# Patient Record
Sex: FEMALE | Race: WHITE
[De-identification: names, ages, dates, MRNs, and addresses within clinical notes are randomized per-mention and may not be internally consistent; named-entity substitution may affect disease eponyms.]

---

## 2017-03-22 ENCOUNTER — HOSPITAL ENCOUNTER (EMERGENCY)
Dept: HOSPITAL 80 - FED | Age: 42
Discharge: HOME | End: 2017-03-22
Payer: COMMERCIAL

## 2017-03-22 VITALS — SYSTOLIC BLOOD PRESSURE: 122 MMHG | DIASTOLIC BLOOD PRESSURE: 71 MMHG | HEART RATE: 82 BPM | OXYGEN SATURATION: 96 %

## 2017-03-22 VITALS — RESPIRATION RATE: 18 BRPM | TEMPERATURE: 98.4 F

## 2017-03-22 DIAGNOSIS — J45.901: Primary | ICD-10-CM

## 2017-03-22 NOTE — EDPHY
H & P


Time Seen by Provider: 03/22/17 12:09


HPI/ROS: 





CHIEF COMPLAINT:  Asthma exacerbation





HISTORY OF PRESENT ILLNESS:  Patient is a 41-year-old healthy female with a 

history of asthma who comes to the emergency department after an exacerbation.  

She was at the pediatrician's office with her daughter was recently diagnosed 

with influenza.  She became short of breath and coughing.  She was treated 

there with a albuterol neb and Solu-Medrol.  She has a transferred here by EMS.

  She feels much better.  Her lung sounds are clear and she is saturating 98% 

on room air.  She does have a cough and chills. She also complains of chest 

tightness.  No history of cardiac disease.








REVIEW OF SYSTEMS:


Constitutional:  denies: chills, fever, recent illness, recent injury


EENTM: denies: blurred vision, double vision, nose congestion


Respiratory:  See HPI


Cardiac: denies: chest pain, irregular heart rate, lightheadedness, palpitations


Gastrointestinal/Abdominal: denies: abdominal pain, diarrhea, nausea, vomiting, 

blood streaked stools


Genitourinary: denies: dysuria, frequency, hematuria, pain


Musculoskeletal: denies: joint pain, muscle pain


Skin: denies: lesions, rash, jaundice, bruising


Neurological: denies: headache, numbness, paresthesia, tingling, dizziness, 

weakness


Hematologic/Lymphatic: denies: blood clots, easy bleeding, easy bruising


Immunologic/allergic: denies: HIV/AIDS, transplant








EXAM:


GENERAL:  Well-appearing, well-nourished and in no acute distress.


HEAD:  Atraumatic, normocephalic.


EYES:  Pupils equal round and reactive to light, extraocular movements intact, 

sclera anicteric, conjunctiva are normal.


ENT:  TMs normal, nares patent, oropharynx clear without exudates.  Moist 

mucous membranes.


NECK:  Normal range of motion, supple without lymphadenopathy or JVD.


LUNGS:  Breath sounds clear to auscultation bilaterally and equal.  No wheezes 

rales or rhonchi.


HEART:  Regular rate and rhythm without murmurs, rubs or gallops.


ABDOMEN:  Soft, nontender, normoactive bowel sounds.  No guarding, no rebound.  

No masses appreciated.


BACK:  No CVA tenderness, no spinal tenderness, step-offs or deformities


EXTREMITIES:  Normal range of motion, no pitting or edema.  No clubbing or 

cyanosis.


NEUROLOGICAL:  Cranial nerves II through XII grossly intact.  Normal speech, 

normal gait.  5/5 strength, normal movement in all extremities, normal sensation


PSYCH:  Normal mood, normal affect.


SKIN:  Warm, dry, normal turgor, no visible rashes or lesions.








Source: Patient


Exam Limitations: No limitations





- Medical/Surgical History


Hx Asthma: Yes


Hx Chronic Respiratory Disease: No


Hx Diabetes: No


Hx Cardiac Disease: No


Hx Renal Disease: No


Hx Cirrhosis: No


Hx Alcoholism: No


Hx HIV/AIDS: No


Hx Splenectomy or Spleen Trauma: No


Other PMH: PMH: asthma.  PSH: denies





- Family History


Significant Family History: Asthma





- Social History


Smoking Status: Never smoked


Alcohol Use: Sober


Drug Use: None


Constitutional: 


 Initial Vital Signs











Temperature (C)  36.9 C   03/22/17 12:23


 


Heart Rate  97   03/22/17 12:23


 


Respiratory Rate  18   03/22/17 12:23


 


Blood Pressure  124/72 H  03/22/17 12:23


 


O2 Sat (%)  99   03/22/17 12:23








 











O2 Delivery Mode               Room Air














Allergies/Adverse Reactions: 


 





No Known Allergies Allergy (Unverified 07/17/11 08:28)


 








Home Medications: 














 Medication  Instructions  Recorded


 


Flonase Allergy Relief  09/27/16


 


Loratadine  09/27/16


 


Wellbutrin Sr  09/27/16


 


Oseltamivir Phosphate [Tamiflu 75 75 mg PO BID #10 cap 03/22/17





mg (*)]  


 


predniSONE 60 mg PO DAILY #15 tab 03/22/17














Medical Decision Making





- Diagnostics


EKG Interpretation: 





An EKG obtained and was read and documented in trace view.  Please see trace 

view for full reading and report.  Sinus rhythm, no acute ischemic changes 


Imaging: 





X-ray: [chest x-ray ] was obtained.  I viewed the images myself on the PACS 

system.  My interpretation of the images is:  [negative for acute disease ].  

The radiologist interpretation is  pending.


ED Course/Re-evaluation: 





1:40 p.m. patient states that she is feeling completely better.  She is asking 

for a course of steroids to help with her coughing inflammation and wheezing.  

I agree that this is reasonable.  She already has albuterol at home and 

nebulized and inhaler form.  Her daughter is flu positive.  Patient does not 

have fevers body aches but will give her prescription for Tamiflu cause a.m. 

suspicious that she has early symptoms of flu that have not yet converted on 

her testing swabs.


Differential Diagnosis: 





Partial list of the Differential diagnosis considered include but were not 

limited to;  asthma exacerbation, upper respiratory tract infection, influenza 

and although unlikely based on the history and physical exam, I also considered 

pneumonia, PE, acute coronary disease.  I discussed these differential 

diagnoses and the plan with the patient as well as the usual and expected 

course.  The patient understands that the diagnosis is provisional and that in 

medicine we are not always correct and that further workup is often warranted.  

Usual and customary warnings were given.  All of the patient's questions were 

answered.  The patient was instructed to return to the emergency department 

should the symptoms at all worsen or return, otherwise to followup with the 

physician as we discussed.





- Data Points


Laboratory Results: 


 











  03/22/17





  12:20


 


Influenza Typ A,B (DFA)  NEGATIVE FOR FLU 





   (NEGATIVE) 














Departure





- Departure


Disposition: Home, Routine, Self-Care


Clinical Impression: 


 Exacerbation of asthma





Condition: Fair


Instructions:  Asthma (ED)


Referrals: 


Patient,NotPresent [Unknown] - As per Instructions


Prescriptions: 


Oseltamivir Phosphate [Tamiflu 75 mg (*)] 75 mg PO BID #10 cap


predniSONE 60 mg PO DAILY #15 tab

## 2017-03-22 NOTE — CPEKG
Heart Rate: 83

RR Interval: 723

P-R Interval: 164

QRSD Interval: 90

QT Interval: 372

QTC Interval: 437

P Axis: 75

QRS Axis: 44

T Wave Axis: 10

EKG Severity - NORMAL ECG -

EKG Impression: SINUS RHYTHM

Electronically Signed By: Sukhi Mccoy 22-Mar-2017 13:20:03

## 2017-03-25 ENCOUNTER — HOSPITAL ENCOUNTER (OUTPATIENT)
Dept: HOSPITAL 80 - FIMAGING | Age: 42
End: 2017-03-25
Attending: INTERNAL MEDICINE
Payer: COMMERCIAL

## 2017-03-25 DIAGNOSIS — R91.8: ICD-10-CM

## 2017-03-25 DIAGNOSIS — R07.9: Primary | ICD-10-CM

## 2017-04-02 ENCOUNTER — HOSPITAL ENCOUNTER (EMERGENCY)
Dept: HOSPITAL 80 - FED | Age: 42
Discharge: HOME | End: 2017-04-02
Payer: COMMERCIAL

## 2017-04-02 VITALS
HEART RATE: 70 BPM | RESPIRATION RATE: 16 BRPM | OXYGEN SATURATION: 97 % | DIASTOLIC BLOOD PRESSURE: 72 MMHG | SYSTOLIC BLOOD PRESSURE: 108 MMHG | TEMPERATURE: 97.7 F

## 2017-04-02 DIAGNOSIS — R51: Primary | ICD-10-CM

## 2017-04-02 DIAGNOSIS — J45.909: ICD-10-CM

## 2017-04-02 NOTE — EDPHY
H & P


Stated Complaint: uri symptoms/rx vicodin and abx now with 3 days ha/n/v


Source: Patient


Exam Limitations: No limitations





- Personal History


LMP (Females 10-55): 1-7 Days Ago


Current Tetanus/Diphtheria Vaccine: Yes





- Medical/Surgical History


Hx Asthma: Yes


Hx Chronic Respiratory Disease: No


Hx Diabetes: No


Hx Cardiac Disease: No


Hx Renal Disease: No


Hx Cirrhosis: No


Hx Alcoholism: No


Hx HIV/AIDS: No


Hx Splenectomy or Spleen Trauma: No


Other PMH: PMH: asthma.  PSH: denies





- Social History


Smoking Status: Never smoked


Time Seen by Provider: 04/02/17 16:52


HPI/ROS: 





CHIEF COMPLAINT: headache, vomiting





HISTORY OF PRESENT ILLNESS:  41-year-old female presents emergency department 

complaining a headache, nausea and vomiting.  Patient is on day 7 of 10 of 

Augmentin for on upper respiratory infection.  She has a history of asthma, she 

finished a 5 day course of prednisone 2 days ago.  Patient reports her URI 

symptoms are improving though she has had a 2 day history of headache and 

vomiting. Patient reports photophobia.  Patient reports a history of headaches 

though she has not had 1 like this for several months.  Patient states her 

headache has improved since arriving in the emergency department.  She denies 

neck pain. Headache comes and goes.  Patient reports she vomits from the pain. 

No diarrhea, no chest pain or shortness of breath. 





REVIEW OF SYSTEMS:


A comprehensive 10 point review of systems is otherwise negative aside from 

elements mentioned in the history of present illness. (Melissa Rios)





- Physical Exam


Exam: 





Physical Exam


Gen: Alert and Oriented, NAD 


HEENT: PERRL, moist mucous membranes 


NECK: no meningismus


CV: regular rate and regular rhythm


PULM: CTAB, mild expiratory wheezes throughout


ABDOMEN: soft, non tender to palpation, BS present 


BACK: No CVA tenderness


NEURO:  Neurologically grossly intact 


EXTREMITIES: normal appearing


SKIN: no rash or break in skin on exposed skin


PSYCH: answers questions appropriately. (Melissa Rios)


Constitutional: 





 Initial Vital Signs











Temperature (C)  36.3 C   04/02/17 16:27


 


Heart Rate  66   04/02/17 16:27


 


Respiratory Rate  22 H  04/02/17 16:27


 


Blood Pressure  99/75 L  04/02/17 16:27


 


O2 Sat (%)  98   04/02/17 16:27








 











O2 Delivery Mode               Room Air














Allergies/Adverse Reactions: 


 





No Known Allergies Allergy (Verified 04/02/17 16:26)


 








Home Medications: 














 Medication  Instructions  Recorded


 


Flonase Allergy Relief  09/27/16


 


Loratadine  09/27/16


 


Wellbutrin Sr  09/27/16


 


Oseltamivir Phosphate [Tamiflu 75 75 mg PO BID #10 cap 03/22/17





mg (*)]  


 


predniSONE 60 mg PO DAILY #15 tab 03/22/17














Medical Decision Making


ED Course/Re-evaluation: 





IV established, patient is given 1 L of normal saline.  She is given a migraine 

cocktail for headache. Patient was re-evaluated an hour later with complete 

resolution of her headache. She is requesting to be discharged home.  The 

patient is given strict return precautions for any worsening symptoms, new 

symptoms or concerns. (Melissa Rios)


Differential Diagnosis: 





The differential diagnosis for the patient's headache included but was not 

limited to subarachnoid hemorrhage, migraine headache, tension headache and 

infectious causes such as meningitis, pharyngitis and sinusitis. (Melissa Rios)


Other Provider: 





The patient wasevaluatedand managed by themidlevel provider.  Idiscussed 

the patient's presentation and course with thephysicianassistantor nurse

practitionerand agree with theevaluation. My co-signature indicates that I

have reviewed this chart and I agree with the findings and plan of care as

documented. I am the secondary supervisingphysician. (Marisol Hodgson)





- Data Points


Medications Given: 





 








Discontinued Medications





Acetaminophen (Tylenol)  650 mg PO EDNOW ONE


   Stop: 04/02/17 17:26


   Last Admin: 04/02/17 17:47 Dose:  650 mg


Dexamethasone (Decadron Injection)  10 mg IVP EDNOW ONE


   Stop: 04/02/17 17:27


   Last Admin: 04/02/17 17:47 Dose:  10 mg


Diphenhydramine HCl (Benadryl Injection)  25 mg IVP EDNOW ONE


   Stop: 04/02/17 17:25


   Last Admin: 04/02/17 17:46 Dose:  25 mg


Sodium Chloride (Ns)  1,000 mls @ 0 mls/hr IV ONCE ONE


   PRN Reason: Wide Open


   Stop: 04/02/17 17:24


   Last Admin: 04/02/17 17:30 Dose:  1,000 mls


Ketorolac Tromethamine (Toradol)  15 mg IVP EDNOW ONE


   Stop: 04/02/17 17:25


   Last Admin: 04/02/17 17:47 Dose:  15 mg


Metoclopramide HCl (Reglan Injection)  10 mg IVP EDNOW ONE


   Stop: 04/02/17 17:25


   Last Admin: 04/02/17 17:47 Dose:  10 mg








Departure





- Departure


Disposition: Home, Routine, Self-Care


Clinical Impression: 


Headache


Qualifiers:


 Headache type: unspecified Headache chronicity pattern: acute headache 

Intractability: not intractable Qualified Code(s): R51 - Headache





Condition: Good


Instructions:  General Headache (ED)


Additional Instructions: 


Stay hydrated, take 600 mg of ibuprofen every 8 hours with food for 3-5 days, 

you could also take 650 mg of Tylenol every 8 hours, you can alternate these 

every 4 hours.  Follow-up with Dr. Genao next week for symptoms that are not 

improving, return to the emergency department for worsening symptoms, fevers, 

neck pain, seizure activity, any new symptoms or concerns.


Referrals: 


Delon Genao MD [Primary Care Provider] - As per Instructions

## 2017-09-15 ENCOUNTER — HOSPITAL ENCOUNTER (OUTPATIENT)
Dept: HOSPITAL 80 - BMCIMAGING | Age: 42
End: 2017-09-15
Attending: EMERGENCY MEDICINE
Payer: COMMERCIAL

## 2017-09-15 DIAGNOSIS — M25.531: Primary | ICD-10-CM

## 2017-12-02 ENCOUNTER — HOSPITAL ENCOUNTER (EMERGENCY)
Dept: HOSPITAL 80 - FED | Age: 42
Discharge: HOME | End: 2017-12-02
Payer: COMMERCIAL

## 2017-12-02 VITALS
OXYGEN SATURATION: 96 % | SYSTOLIC BLOOD PRESSURE: 107 MMHG | HEART RATE: 55 BPM | RESPIRATION RATE: 14 BRPM | DIASTOLIC BLOOD PRESSURE: 78 MMHG

## 2017-12-02 VITALS — TEMPERATURE: 98.2 F

## 2017-12-02 DIAGNOSIS — J45.909: ICD-10-CM

## 2017-12-02 DIAGNOSIS — N39.0: Primary | ICD-10-CM

## 2017-12-02 DIAGNOSIS — B96.89: ICD-10-CM

## 2017-12-02 LAB
% IMMATURE GRANULYOCYTES: 0.2 % (ref 0–1.1)
ABSOLUTE IMMATURE GRANULOCYTES: 0.01 10^3/UL (ref 0–0.1)
ABSOLUTE NRBC COUNT: 0 10^3/UL (ref 0–0.01)
ADD DIFF?: NO
ADD MORPH?: NO
ADD SCAN?: NO
ANION GAP SERPL CALC-SCNC: 11 MEQ/L (ref 8–16)
ATYPICAL LYMPHOCYTE FLAG: 20 (ref 0–99)
CALCIUM SERPL-MCNC: 9.6 MG/DL (ref 8.5–10.4)
CHLORIDE SERPL-SCNC: 106 MEQ/L (ref 97–110)
CO2 SERPL-SCNC: 23 MEQ/L (ref 22–31)
COLOR UR: YELLOW
CREAT SERPL-MCNC: 0.7 MG/DL (ref 0.6–1)
ERYTHROCYTE [DISTWIDTH] IN BLOOD BY AUTOMATED COUNT: 11.9 % (ref 11.5–15.2)
FRAGMENT RBC FLAG: 0 (ref 0–99)
GFR SERPL CREATININE-BSD FRML MDRD: > 60 ML/MIN/{1.73_M2}
GLUCOSE SERPL-MCNC: 81 MG/DL (ref 70–100)
HCT VFR BLD CALC: 41 % (ref 38–47)
HGB BLD-MCNC: 14.2 G/DL (ref 12.6–16.3)
LEFT SHIFT FLG: 0 (ref 0–99)
LIPEMIA HEMOLYSIS FLAG: 90 (ref 0–99)
MCH RBC BLDCO QN: 30.1 PG (ref 27.9–34.1)
MCHC RBC AUTO-ENTMCNC: 34.6 G/DL (ref 32.4–36.7)
MCV RBC AUTO: 87 FL (ref 81.5–99.8)
MUCOUS THREADS #/AREA URNS LPF: (no result) /LPF
NITRITE UR QL STRIP: NEGATIVE
NRBC-AUTO%: 0 % (ref 0–0.2)
PH UR STRIP: 5 [PH] (ref 5–7.5)
PLATELET # BLD: 200 10^3/UL (ref 150–400)
PLATELET CLUMPS FLAG: 20 (ref 0–99)
PMV BLD AUTO: 9.9 FL (ref 8.7–11.7)
POTASSIUM SERPL-SCNC: 4 MEQ/L (ref 3.5–5.2)
RBC # BLD AUTO: 4.71 10^6/UL (ref 4.18–5.33)
RBC #/AREA URNS HPF: (no result) /HPF (ref 0–3)
SODIUM SERPL-SCNC: 140 MEQ/L (ref 134–144)
SP GR UR STRIP: 1.02 (ref 1–1.03)
WBC #/AREA URNS HPF: (no result) /HPF (ref 0–3)

## 2017-12-02 NOTE — EDPHY
H & P


Time Seen by Provider: 12/02/17 15:11


HPI/ROS: 


CHIEF COMPLAINT: RLQ pain, nausea





HISTORY OF PRESENT ILLNESS:


The patient is a 41 y/o female complaining of sudden onset, stabbing pain in 

her RLQ, onset this morning. The pain is moderate, without allev/aggrev 

factors.  Associated with nausea.  She has had cold-like symptoms with an 

increase in mucous.  Onset of URI sx 1 week ago, associated with intermitted 

fever; today temp of 100F. Cough is moist, not painful.  Took Advil and Dayquil 

for her cold-like symptoms. Denies history of abdominal pain or surgery. Denies 

shortness of breath, chest pain, urinary complaints or other pertinent symptoms.





REVIEW OF SYSTEMS:


Aside from elements discussed in the HPI, a comprehensive 10-point review of 

systems was reviewed and is negative.





Past Medical/Surgical History: 


Asthma, ovarian cysts





Social History: 


 at bedside, lives in [place], works for [employer]





Smoking Status: Never smoked


Physical Exam: 


General Appearance: Alert, pleasant


Eyes: Pupils equal and round, no conjunctival pallor


ENT, Mouth: Mucous membranes moist


Neck: Normal inspection


Respiratory: Lungs are clear to auscultation


Cardiovascular: Regular rate and rhythm 


Gastrointestinal:  RLQ tenderness, abdomen is soft


Neurological:  A&O, nonfocal, normal gait


Skin:  Warm and dry


Extremities:  Normal inspection


Psychiatric: Mood and affect normal





Constitutional: 


 Initial Vital Signs











Temperature (C)  36.8 C   12/02/17 14:53


 


Heart Rate  77   12/02/17 14:53


 


Respiratory Rate  16   12/02/17 14:53


 


Blood Pressure  105/73   12/02/17 14:53


 


O2 Sat (%)  99   12/02/17 14:53








 











O2 Delivery Mode               Room Air














Allergies/Adverse Reactions: 


 





No Known Allergies Allergy (Verified 12/02/17 14:56)


 








Home Medications: 














 Medication  Instructions  Recorded


 


Flonase Allergy Relief  09/27/16


 


Loratadine  09/27/16


 


Wellbutrin Sr  09/27/16


 


Oseltamivir Phosphate [Tamiflu 75 75 mg PO BID #10 cap 03/22/17





mg (*)]  


 


predniSONE 60 mg PO DAILY #15 tab 03/22/17


 


Cephalexin [Keflex (*)] 500 mg PO QID #20 cap 12/02/17


 


Ondansetron Odt [Zofran Odt 4 mg 4 mg PO Q4 PRN #6 tab 12/02/17





(*)]  














Medical Decision Making





- Diagnostics


Imaging Results: 


 Imaging Impressions





Abdomen Ultrasound  12/02/17 15:29


Impression:


 


1.  The appendix was not seen.


2.  Trace fluid, nonspecific.


3.  Probable small corpus luteum cyst in the right ovary.


 


Dr. Patiño discussed these findings by telephone with [facility] CHILD on 12/2/ 2017 16:34.


 








Pelvic/Renal Ultrasound  12/02/17 15:29


Impression:


 


1.  The appendix was not seen.


2.  Trace fluid, nonspecific.


3.  Probable small corpus luteum cyst in the right ovary.


 


Dr. Patiño discussed these findings by telephone with [facility] CH[facility] on 12/2/ 2017 16:34.


 











Imaging: Discussed imaging studies w/ On call Radiologist, I viewed and 

interpreted images myself


ED Course/Re-evaluation: 


The patient is a 41 y/o female presenting with nausea and RLQ tenderness to 

palpation. 1L IV NS and 4mg IV Zofran administered. Abdominal and pelvic/renal 

ultrasound ordered, as well as a UA.  Pain most c/w ovarian cyst, will eval for 

appy too.





Patient's urinalysis reveals a UTI. 





1634: Spoke with Dr. Patiño, radiologist, he reports the ultrasound is 

negative. There is a subsequent finding of a probable small corpus luteum cyst 

in the right ovary.





1646: Reassessed patient and discussed positive UTI results and negative 

ultrasound results.  She understands that the appendix was not visualized.  I 

discussed the options with her, including CT scan of the abdomen pelvis now to 

rule out appendicitis versus discharge home and return in 12-24 hours for 

persistent symptoms.  She would like to go home and will return if she gets 

worse.  Abdominal exam is unchanged.  15mg IV Toradol administered. I have 

prescribed Keflex for her UTI and Zofran for her nausea.  Urine culture sent.  

Return precautions provided; patient is comfortable with this plan. 





Differential Diagnosis: 





Differential diagnosis includes though it is not limited to appendicitis, 

cholecystitis, diverticulitis, pyelonephritis, bowel perforation, small bowel 

obstruction.





- Data Points


Laboratory Results: 


 Laboratory Results





 12/02/17 15:20 





 12/02/17 15:20 





 











  12/02/17 12/02/17 12/02/17





  15:20 15:20 15:20


 


WBC      4.44 10^3/uL 10^3/uL





     (3.80-9.50) 


 


RBC      4.71 10^6/uL 10^6/uL





     (4.18-5.33) 


 


Hgb      14.2 g/dL g/dL





     (12.6-16.3) 


 


Hct      41.0 % %





     (38.0-47.0) 


 


MCV      87.0 fL fL





     (81.5-99.8) 


 


MCH      30.1 pg pg





     (27.9-34.1) 


 


MCHC      34.6 g/dL g/dL





     (32.4-36.7) 


 


RDW      11.9 % %





     (11.5-15.2) 


 


Plt Count      200 10^3/uL 10^3/uL





     (150-400) 


 


MPV      9.9 fL fL





     (8.7-11.7) 


 


Neut % (Auto)      45.3 % %





     (39.3-74.2) 


 


Lymph % (Auto)      42.8 % %





     (15.0-45.0) 


 


Mono % (Auto)      6.8 % %





     (4.5-13.0) 


 


Eos % (Auto)      3.8 % %





     (0.6-7.6) 


 


Baso % (Auto)      1.1 % %





     (0.3-1.7) 


 


Nucleat RBC Rel Count      0.0 % %





     (0.0-0.2) 


 


Absolute Neuts (auto)      2.01 10^3/uL 10^3/uL





     (1.70-6.50) 


 


Absolute Lymphs (auto)      1.90 10^3/uL 10^3/uL





     (1.00-3.00) 


 


Absolute Monos (auto)      0.30 10^3/uL 10^3/uL





     (0.30-0.80) 


 


Absolute Eos (auto)      0.17 10^3/uL 10^3/uL





     (0.03-0.40) 


 


Absolute Basos (auto)      0.05 10^3/uL 10^3/uL





     (0.02-0.10) 


 


Absolute Nucleated RBC      0.00 10^3/uL 10^3/uL





     (0-0.01) 


 


Immature Gran %      0.2 % %





     (0.0-1.1) 


 


Immature Gran #      0.01 10^3/uL 10^3/uL





     (0.00-0.10) 


 


Sodium    140 mEq/L mEq/L  





    (134-144)  


 


Potassium    4.0 mEq/L mEq/L  





    (3.5-5.2)  


 


Chloride    106 mEq/L mEq/L  





    ()  


 


Carbon Dioxide    23 mEq/l mEq/l  





    (22-31)  


 


Anion Gap    11 mEq/L mEq/L  





    (8-16)  


 


BUN    9 mg/dL mg/dL  





    (7-23)  


 


Creatinine    0.7 mg/dL mg/dL  





    (0.6-1.0)  


 


Estimated GFR    > 60   





    


 


Glucose    81 mg/dL mg/dL  





    ()  


 


Calcium    9.6 mg/dL mg/dL  





    (8.5-10.4)  


 


Beta HCG, Qual  NEGATIVE     





    


 


Urine Color      





    


 


Urine Appearance      





    


 


Urine pH      





    


 


Ur Specific Gravity      





    


 


Urine Protein      





    


 


Urine Ketones      





    


 


Urine Blood      





    


 


Urine Nitrate      





    


 


Urine Bilirubin      





    


 


Urine Urobilinogen      





    


 


Ur Leukocyte Esterase      





    


 


Urine RBC      





    


 


Urine WBC      





    


 


Ur Epithelial Cells      





    


 


Urine Mucus      





    


 


Urine Glucose      





    














  12/02/17





  15:10


 


WBC  





  


 


RBC  





  


 


Hgb  





  


 


Hct  





  


 


MCV  





  


 


MCH  





  


 


MCHC  





  


 


RDW  





  


 


Plt Count  





  


 


MPV  





  


 


Neut % (Auto)  





  


 


Lymph % (Auto)  





  


 


Mono % (Auto)  





  


 


Eos % (Auto)  





  


 


Baso % (Auto)  





  


 


Nucleat RBC Rel Count  





  


 


Absolute Neuts (auto)  





  


 


Absolute Lymphs (auto)  





  


 


Absolute Monos (auto)  





  


 


Absolute Eos (auto)  





  


 


Absolute Basos (auto)  





  


 


Absolute Nucleated RBC  





  


 


Immature Gran %  





  


 


Immature Gran #  





  


 


Sodium  





  


 


Potassium  





  


 


Chloride  





  


 


Carbon Dioxide  





  


 


Anion Gap  





  


 


BUN  





  


 


Creatinine  





  


 


Estimated GFR  





  


 


Glucose  





  


 


Calcium  





  


 


Beta HCG, Qual  





  


 


Urine Color  YELLOW 





  


 


Urine Appearance  CLEAR 





  


 


Urine pH  5.0 





   (5.0-7.5) 


 


Ur Specific Gravity  1.024 





   (1.002-1.030) 


 


Urine Protein  NEGATIVE 





   (NEGATIVE) 


 


Urine Ketones  2+  H 





   (NEGATIVE) 


 


Urine Blood  NEGATIVE 





   (NEGATIVE) 


 


Urine Nitrate  NEGATIVE 





   (NEGATIVE) 


 


Urine Bilirubin  NEGATIVE 





   (NEGATIVE) 


 


Urine Urobilinogen  NEGATIVE EU EU





   (0.2-1.0) 


 


Ur Leukocyte Esterase  1+  H 





   (NEGATIVE) 


 


Urine RBC  15-25 /hpf H /hpf





   (0-3) 


 


Urine WBC  5-10 /hpf H /hpf





   (0-3) 


 


Ur Epithelial Cells  TRACE /lpf /lpf





   (NONE-1+) 


 


Urine Mucus  1+ /lpf /lpf





   (NONE-1+) 


 


Urine Glucose  NEGATIVE 





   (NEGATIVE) 











Medications Given: 


 








Discontinued Medications





Cephalexin HCl (Keflex)  500 mg PO EDNOW ONE


   PRN Reason: Protocol


   Stop: 12/02/17 17:08


   Last Admin: 12/02/17 17:11 Dose:  500 mg


Sodium Chloride (Ns)  1,000 mls @ 0 mls/hr IV ONCE ONE; Wide Open


   PRN Reason: Protocol


   Stop: 12/02/17 15:29


   Last Admin: 12/02/17 15:30 Dose:  1,000 mls


Ketorolac Tromethamine (Toradol)  15 mg IVP EDNOW ONE


   Stop: 12/02/17 16:47


   Last Admin: 12/02/17 16:58 Dose:  15 mg


Ondansetron HCl (Zofran)  4 mg IVP EDNOW ONE


   Stop: 12/02/17 15:23


   Last Admin: 12/02/17 15:30 Dose:  4 mg


Ondansetron HCl (Zofran Odt 4 Mg Prepack#2)  1 btl TAKEHOME EDNOW ONE


   Stop: 12/02/17 17:06


   Last Admin: 12/02/17 17:11 Dose:  1 btl








Departure





- Departure


Disposition: Home, Routine, Self-Care


Clinical Impression: 


Urinary tract infection


Qualifiers:


 Urinary tract infection type: site unspecified Hematuria presence: without 

hematuria Qualified Code(s): N39.0 - Urinary tract infection, site not specified





Abdominal pain


Qualifiers:


 Abdominal location: right lower quadrant Qualified Code(s): R10.31 - Right 

lower quadrant pain





Condition: Good


Instructions:  Cephalexin (By mouth), Ondansetron (By mouth), Urinary Tract 

Infection in Women (ED), Acute Abdominal Pain (ED)


Additional Instructions: 


Take Keflex as prescribed for your UTI.





Take Zofran as prescribed for nausea.





Return in 12-24 hours for a recheck if the pain persists. Return to the 

Emergency Department for worsening symptoms, such as fever or worsening pain.





Referrals: 


Delon Genao MD [Primary Care Provider] - As per Instructions


Prescriptions: 


Cephalexin [Keflex (*)] 500 mg PO QID #20 cap


Ondansetron Odt [Zofran Odt 4 mg (*)] 4 mg PO Q4 PRN #6 tab


 PRN Reason: Nausea


Report Scribed for: Emma Child


Report Scribed by: Anna Marie Dunham


Date of Report: 12/02/17


Time of Report: 15:21


Physician Review and Approval Statement: 





12/02/17 15:21


Portions of this note were transcribed by a medical scribe.  I personally 

performed a history, physical exam, medical decision making, and confirmed 

accuracy of information the transcribed note.

## 2017-12-20 ENCOUNTER — HOSPITAL ENCOUNTER (OUTPATIENT)
Dept: HOSPITAL 80 - FIMAGING | Age: 42
End: 2017-12-20
Attending: NURSE PRACTITIONER
Payer: COMMERCIAL

## 2017-12-20 DIAGNOSIS — N83.202: ICD-10-CM

## 2017-12-20 DIAGNOSIS — R10.31: Primary | ICD-10-CM

## 2017-12-20 DIAGNOSIS — N94.89: ICD-10-CM

## 2018-06-04 ENCOUNTER — HOSPITAL ENCOUNTER (EMERGENCY)
Dept: HOSPITAL 80 - FED | Age: 43
Discharge: HOME | End: 2018-06-04
Payer: COMMERCIAL

## 2018-06-04 VITALS — SYSTOLIC BLOOD PRESSURE: 109 MMHG | DIASTOLIC BLOOD PRESSURE: 71 MMHG

## 2018-06-04 DIAGNOSIS — J45.909: ICD-10-CM

## 2018-06-04 DIAGNOSIS — R07.89: Primary | ICD-10-CM

## 2018-06-04 LAB — PLATELET # BLD: 203 10^3/UL (ref 150–400)

## 2018-06-04 NOTE — EDPHY
H & P


Stated Complaint: L SIDED CP, WORSENING WITH BREATHING


Time Seen by Provider: 06/04/18 03:56


HPI/ROS: 





Chief Complaint:  Chest pain





HPI:  32-year-old woman woke this morning with severe chest pain in her left 

chest.  It comes in waves.  It is worse with movement and deep breathing.  Is 

associated with some mild shortness of breath.  Occasionally gets some central 

chest pressure as well.  No fevers or chills.  No cough.  No leg pain or 

swelling.  No recent travel.  No family history of blood clotting disorders PE.

  No family history of coronary artery disease.  She does not smoke cigarettes 

but does smoke marijuana.  Pain is a 9/10 when it comes.  No recent falls or 

injuries.  Does not have a history of similar episodes in the past.





ROS:  10 point Review of Systems is negative except as noted in the HPI.





PMH:  Asthma





Social History: No smoking, no alcohol, occasional marijuana





Family History:  No family history of blood clotting disorders or coronary 

artery disease





Physical Exam:


Gen: Awake, Alert, uncomfortable appearing


HEENT:  


     Nose: no rhinorrhea


     Eyes: PERRLA, EOMI


     Mouth: Moist mucosa 


Neck: Supple, no JVD


Chest: nontender, lungs clear to auscultation


Heart: S1, S2 normal, no murmur


Abd: Soft, non-tender, no guarding


Back: no CVA tenderness, no midline tenderness 


Ext: no edema, non-tender


Skin: no rash


Neuro: CN II-XII intact, Sensation grossly intact, Strength 5/5 in bilateral 

upper and lower extremities








- Personal History


LMP (Females 10-55): 8-14 Days Ago


Current Tetanus Diphtheria and Acellular Pertussis (TDAP): Unsure





- Medical/Surgical History


Hx Asthma: Yes


Hx Chronic Respiratory Disease: No


Hx Diabetes: No


Hx Cardiac Disease: No


Hx Renal Disease: No


Hx Cirrhosis: No


Hx Alcoholism: No


Hx HIV/AIDS: No


Hx Splenectomy or Spleen Trauma: No


Other PMH: PMH: asthma.  PSH: denies





- Social History


Smoking Status: Never smoked


Constitutional: 


 Initial Vital Signs











Temperature (C)  36.7 C   06/04/18 03:52


 


Heart Rate  62   06/04/18 03:52


 


Respiratory Rate  16   06/04/18 03:52


 


Blood Pressure  104/76   06/04/18 03:52


 


O2 Sat (%)  100   06/04/18 03:52








 











O2 Delivery Mode               Room Air














Allergies/Adverse Reactions: 


 





No Known Allergies Allergy (Verified 12/02/17 14:56)


 








Home Medications: 














 Medication  Instructions  Recorded


 


Flonase Allergy Relief  09/27/16


 


Loratadine  09/27/16














Medical Decision Making





- Diagnostics


EKG Interpretation: 





ECG time 4:00 a.m., sinus rhythm with a rate of 66, normal axis, normal 

intervals, no acute ST or T-wave changes.  Impression:  Normal ECG.


Imaging Results: 


Chest x-ray is negative per my interpretation


Imaging: I viewed and interpreted images myself


ED Course/Re-evaluation: 





A 42-year-old woman presenting with left lateral chest pain upon waking this 

morning.  No acute ECG changes.  Troponin is negative.  Chest x-ray is 

negative.  D-dimer is normal.  Pain is not reproducible.  It is a bit 

pleuritic.  No evidence of acute cardiopulmonary process.  There is no 

pneumothorax.  No evidence of PE.  No acute coronary syndrome.  Patient has had 

relief with morphine and Toradol.  I think symptoms are consistent with morbid 

chest wall.  She has not have a risk factors for coronary artery disease.  Plan 

we will discharge continuing anti-inflammatories and follow up with primary 

care physician.





- Data Points


Laboratory Results: 


 Laboratory Results





 06/04/18 04:05 





 06/04/18 04:05 





 











  06/04/18 06/04/18 06/04/18





  04:07 04:05 04:05


 


WBC      





    


 


RBC      





    


 


Hgb      





    


 


Hct      





    


 


MCV      





    


 


MCH      





    


 


MCHC      





    


 


RDW      





    


 


Plt Count      





    


 


MPV      





    


 


Neut % (Auto)      





    


 


Lymph % (Auto)      





    


 


Mono % (Auto)      





    


 


Eos % (Auto)      





    


 


Baso % (Auto)      





    


 


Nucleat RBC Rel Count      





    


 


Absolute Neuts (auto)      





    


 


Absolute Lymphs (auto)      





    


 


Absolute Monos (auto)      





    


 


Absolute Eos (auto)      





    


 


Absolute Basos (auto)      





    


 


Absolute Nucleated RBC      





    


 


Immature Gran %      





    


 


Immature Gran #      





    


 


D-Dimer      





    


 


Sodium      141 mEq/L mEq/L





     (135-145) 


 


Potassium      3.8 mEq/L mEq/L





     (3.3-5.0) 


 


Chloride      108 mEq/L mEq/L





     () 


 


Carbon Dioxide      21 mEq/l L mEq/l





     (22-31) 


 


Anion Gap      12 mEq/L mEq/L





     (8-16) 


 


BUN      18 mg/dL mg/dL





     (7-23) 


 


Creatinine      0.8 mg/dL mg/dL





     (0.6-1.0) 


 


Estimated GFR      > 60 





    


 


Glucose      90 mg/dL mg/dL





     () 


 


Calcium      8.7 mg/dL mg/dL





     (8.5-10.4) 


 


POC Troponin I  0.00 ng/mL ng/mL    





   (0.00-0.08)   


 


Beta HCG, Qual    NEGATIVE   





    














  06/04/18 06/04/18





  04:05 04:05


 


WBC    6.67 10^3/uL 10^3/uL





    (3.80-9.50) 


 


RBC    4.67 10^6/uL 10^6/uL





    (4.18-5.33) 


 


Hgb    14.1 g/dL g/dL





    (12.6-16.3) 


 


Hct    40.4 % %





    (38.0-47.0) 


 


MCV    86.5 fL fL





    (81.5-99.8) 


 


MCH    30.2 pg pg





    (27.9-34.1) 


 


MCHC    34.9 g/dL g/dL





    (32.4-36.7) 


 


RDW    12.2 % %





    (11.5-15.2) 


 


Plt Count    203 10^3/uL 10^3/uL





    (150-400) 


 


MPV    9.8 fL fL





    (8.7-11.7) 


 


Neut % (Auto)    41.7 % %





    (39.3-74.2) 


 


Lymph % (Auto)    46.5 % H %





    (15.0-45.0) 


 


Mono % (Auto)    6.0 % %





    (4.5-13.0) 


 


Eos % (Auto)    4.8 % %





    (0.6-7.6) 


 


Baso % (Auto)    0.7 % %





    (0.3-1.7) 


 


Nucleat RBC Rel Count    0.0 % %





    (0.0-0.2) 


 


Absolute Neuts (auto)    2.78 10^3/uL 10^3/uL





    (1.70-6.50) 


 


Absolute Lymphs (auto)    3.10 10^3/uL H 10^3/uL





    (1.00-3.00) 


 


Absolute Monos (auto)    0.40 10^3/uL 10^3/uL





    (0.30-0.80) 


 


Absolute Eos (auto)    0.32 10^3/uL 10^3/uL





    (0.03-0.40) 


 


Absolute Basos (auto)    0.05 10^3/uL 10^3/uL





    (0.02-0.10) 


 


Absolute Nucleated RBC    0.00 10^3/uL 10^3/uL





    (0-0.01) 


 


Immature Gran %    0.3 % %





    (0.0-1.1) 


 


Immature Gran #    0.02 10^3/uL 10^3/uL





    (0.00-0.10) 


 


D-Dimer  < 0.27 ug/mLFEU ug/mLFEU  





   (0.00-0.50)  


 


Sodium    





   


 


Potassium    





   


 


Chloride    





   


 


Carbon Dioxide    





   


 


Anion Gap    





   


 


BUN    





   


 


Creatinine    





   


 


Estimated GFR    





   


 


Glucose    





   


 


Calcium    





   


 


POC Troponin I    





   


 


Beta HCG, Qual    





   











Medications Given: 


 








Discontinued Medications





Al Hydroxide/Mg Hydroxide (Maalox Susp)  30 ml PO ONCE ONE


   Stop: 06/04/18 04:55


   Last Admin: 06/04/18 04:57 Dose:  30 ml


Aspirin (Aspirin)  324 mg PO EDNOW ONE


   Stop: 06/04/18 04:11


   Last Admin: 06/04/18 04:13 Dose:  324 mg


Ketorolac Tromethamine (Toradol)  15 mg IVP EDNOW ONE


   Stop: 06/04/18 04:37


   Last Admin: 06/04/18 04:39 Dose:  15 mg


Lidocaine (Lidocaine 2% Viscous)  15 ml PO ONCE ONE


   Stop: 06/04/18 04:55


   Last Admin: 06/04/18 04:57 Dose:  15 ml


Morphine Sulfate (Morphine)  4 mg IVP ONCE ONE


   Stop: 06/04/18 04:11


   Last Admin: 06/04/18 04:13 Dose:  4 mg


Ondansetron HCl (Zofran)  4 mg IVP EDNOW ONE


   Stop: 06/04/18 04:11


   Last Admin: 06/04/18 04:11 Dose:  4 mg





Point of Care Test Results: 


 Chemistry











  06/04/18





  04:07


 


POC Troponin I  0.00 ng/mL ng/mL





   (0.00-0.08) 














Departure





- Departure


Disposition: Home, Routine, Self-Care


Clinical Impression: 


 Chest wall pain





Condition: Good


Instructions:  Chest Wall Pain (ED)


Additional Instructions: 


May take ibuprofen, 600 mg 3 times a day.  You may also take acetaminophen, 

1000 mg 3 times a day.


Follow up with primary care physician in 2-3 days for further evaluation.


Return to the emergency department for increasing pain, shortness of breath, 

lightheadedness, fainting, or any other concerns.


Referrals: 


Delon Genao MD [Primary Care Provider] - As per Instructions

## 2018-07-25 ENCOUNTER — HOSPITAL ENCOUNTER (EMERGENCY)
Dept: HOSPITAL 80 - FED | Age: 43
Discharge: HOME | End: 2018-07-25
Payer: COMMERCIAL

## 2018-07-25 VITALS — DIASTOLIC BLOOD PRESSURE: 70 MMHG | SYSTOLIC BLOOD PRESSURE: 115 MMHG

## 2018-07-25 DIAGNOSIS — J45.909: ICD-10-CM
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
## 2018-07-25 DIAGNOSIS — S06.0X0A: Primary | ICD-10-CM
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
## 2018-07-25 NOTE — EDPHY
H & P


Stated Complaint: hit head, neck pain


Time Seen by Provider: 07/25/18 13:48


HPI/ROS: 





CHIEF COMPLAINT:  Severe occipital headache and neck pain following mechanical 

fall





HISTORY OF PRESENT ILLNESS:  The patient presents the ED with complaints of a 

severe occipital headache and neck pain following a mechanical fall.  She 

slipped while walking her dog earlier today landing on her back striking her 

head.  She denies loss of consciousness.  She is not anticoagulated.  She 

denies any complaints of numbness or weakness in her arms or legs.  Past 

medical history is significant for asthma.  The patient is not anticoagulated.  

She denies additional complaints.





REVIEW OF SYSTEMS:


A comprehensive 10 point review of systems is otherwise negative aside from 

elements mentioned in the history of present illness.


Source: Patient





- Personal History


LMP (Females 10-55): 15-21 Days Ago


Current Tetanus/Diphtheria Vaccine: Yes


Current Tetanus Diphtheria and Acellular Pertussis (TDAP): Yes





- Medical/Surgical History


Hx Asthma: Yes


Hx Chronic Respiratory Disease: No


Hx Diabetes: No


Hx Cardiac Disease: No


Hx Renal Disease: No


Hx Cirrhosis: No


Hx Alcoholism: No


Hx HIV/AIDS: No


Hx Splenectomy or Spleen Trauma: No


Other PMH: PMH: asthma.  PSH: denies





- Social History


Smoking Status: Never smoked





- Physical Exam


Exam: 





General Appearance:  Alert, no distress


Head:  Occipital tenderness to palpation without appreciable hematoma


Eyes:  Pupils equal, round, reactive


ENT, Mouth:  No hemotympanum, no oral trauma


Neck:  In cervical spine collar applied in triage, mild midline tenderness


Respiratory:  No chest wall tender, subcutaneous air, lungs clear bilaterally


Cardiovascular:  Regular rate and rhythm


Abdomen:  Abdomen is soft and nontender, pelvis stable


Skin:  No lacerations, No abrasion


Back:  No midline T/L/S pain


Extremities:  Nontender, full range of motion


Neurological:  A&Ox3, normal motor function, normal sensory exam








Constitutional: 


 Initial Vital Signs











Temperature (C)  36.7 C   07/25/18 13:35


 


Heart Rate  73   07/25/18 13:35


 


Respiratory Rate  16   07/25/18 13:35


 


Blood Pressure  115/82 H  07/25/18 13:35


 


O2 Sat (%)  97   07/25/18 13:35








 











O2 Delivery Mode               Room Air














Allergies/Adverse Reactions: 


 





No Known Allergies Allergy (Verified 07/25/18 13:34)


 








Home Medications: 














 Medication  Instructions  Recorded


 


Flonase Allergy Relief  09/27/16


 


Loratadine  09/27/16


 


Advair 250/50 (*)  07/25/18


 


Albuterol Sulfate  07/25/18


 


Ondansetron  07/25/18














Medical Decision Making





- Diagnostics


Imaging Results: 


 Imaging Impressions





Cervical Spine CT  07/25/18 13:53


Impression:


1. No significant intracranial abnormality seen.


2. No acute abnormality seen associated with the cervical spine.


3. Minimal disk space narrowing with marginal osteophytes at C4-C5, C5-C6, and 

C6-C7.


 


If symptoms worsen, additional imaging may be necessary. 


 


Findings discussed with Chandan Viveros M.D.  at  15:00 hour, 7/25/2018.


 








Head CT  07/25/18 13:53


Impression:


1. No significant intracranial abnormality seen.


2. No acute abnormality seen associated with the cervical spine.


3. Minimal disk space narrowing with marginal osteophytes at C4-C5, C5-C6, and 

C6-C7.


 


If symptoms worsen, additional imaging may be necessary. 


 


Findings discussed with Chandan Viveros M.D.  at  15:00 hour, 7/25/2018.


 











ED Course/Re-evaluation: 





The patient presents the ED with a headache and neck pain following mechanical 

fall earlier today.  The patient is neurologically intact.  She complains of a 

severe headache.  Given the mechanism for injury and fall from approximately 5 

ft a CT scan of the brain and cervical spine were ordered to evaluate for 

fracture or intracranial hemorrhage.


Differential Diagnosis: 





Differential diagnosis considered includes intracranial hemorrhage, skull 

fracture, cervical spine fracture, concussion





- Data Points


Medications Given: 


 








Discontinued Medications





Hydrocodone Bitart/Acetaminophen (Norco 5/325)  1 tab PO EDNOW ONE


   Stop: 07/25/18 13:57


   Last Admin: 07/25/18 13:59 Dose:  1 tab


Ondansetron HCl (Zofran Odt)  4 mg PO EDNOW ONE


   Stop: 07/25/18 14:00


   Last Admin: 07/25/18 13:59 Dose:  4 mg








Departure





- Departure


Disposition: Home, Routine, Self-Care


Clinical Impression: 


 Concussion





Condition: Good


Instructions:  Concussion (ED)


Additional Instructions: 


1.  Your CT scans demonstrate no evidence of an intracranial hemorrhage, skull 

fracture or cervical spine fracture.


2. Concussion aftercare as directed.


3. Follow up with our concussion specialist if your having any symptoms of an 

ongoing headache or symptoms past 5-7 days.  You have been given the contact 

number for Dr. Gifford our concussion specialist.


4. Take Ibuprofen or Motrin 600 mg by mouth three times a day.  Tylenol as 

needed for pain.  


Referrals: 


Delon Genao MD [Primary Care Provider] - As per Instructions


Sandra Gifford MD [Medical Doctor] - As per Instructions

## 2018-11-16 ENCOUNTER — HOSPITAL ENCOUNTER (EMERGENCY)
Dept: HOSPITAL 80 - FED | Age: 43
Discharge: HOME | End: 2018-11-16
Payer: COMMERCIAL

## 2018-11-16 VITALS — SYSTOLIC BLOOD PRESSURE: 112 MMHG | DIASTOLIC BLOOD PRESSURE: 68 MMHG

## 2018-11-16 DIAGNOSIS — E86.9: ICD-10-CM

## 2018-11-16 DIAGNOSIS — R07.89: Primary | ICD-10-CM

## 2018-11-16 LAB — PLATELET # BLD: 243 10^3/UL (ref 150–400)

## 2018-11-16 NOTE — EDPHY
H & P


Time Seen by Provider: 11/16/18 07:44


HPI/ROS: 





Chief complaint.  Chest pain





HPI.  Patient 43-year-old female with left-sided chest pain that began 

yesterday.  Fairly sudden onset.  Hurts to take a deep breath, lie down, twist 

and move.  No recent injury or unusual activity.  No fever or cough.  She does 

have a history of asthma but does not feel like this is asthma.  She has no 

unusual leg pain, recent travel, recent surgery.  She has been anxious and her 

stomach has been upset with some diarrhea.  This morning with feeling anxious 

she had a period where she said she was immobile and could not speak and then 

took a Xanax.  She has no history of heart problems or thromboembolic disease.  

No unusual leg pain or swelling.  Pain is described as stabbing.  No radiation.

  Located left anterior chest





ROS


10 systems were reviewed and negative with the exception of the elements 

mentioned in the history of present illness





Past Medical/Surgical History: 





Past medical history significant for anxiety and asthma


Social History: 





, nonsmoker, no alcohol


Smoking Status: Never smoked


Physical Exam: 





General Appearance:  Alert pleasant well-developed female mild distress vital 

signs are stable.  Heart rate 63


Eyes: Pupils equal and round no pallor or injection.


ENT, Mouth:  Mucous membranes are moist.


Respiratory:  There are no retractions, lungs are clear to auscultation.  No 

wheezing


Cardiovascular: Regular rate and rhythm.


Gastrointestinal:   Abdomen is soft and nontender, no masses, bowel sounds 

normal.


Neurological:  Awake and alert, sensory and motor exams grossly normal.


Skin: Warm and dry, no rashes.


Musculoskeletal:  Neck is supple nontender.


Extremities  symmetrical, full range of motion.


Psychiatric: Patient is oriented X 3, there is no agitation.


Constitutional: 


 Initial Vital Signs











Temperature (C)  36.6 C   11/16/18 07:27


 


Heart Rate  63   11/16/18 07:27


 


Respiratory Rate  22 H  11/16/18 07:27


 


Blood Pressure  103/68   11/16/18 07:27


 


O2 Sat (%)  100   11/16/18 07:27








 











O2 Delivery Mode               Room Air














Allergies/Adverse Reactions: 


 





No Known Allergies Allergy (Verified 11/16/18 07:26)


 








Home Medications: 














 Medication  Instructions  Recorded


 


Flonase Allergy Relief  09/27/16


 


Advair 250/50 (*)  07/25/18


 


Albuterol Sulfate  07/25/18


 


Hydrocodone/APAP 5/325 [Norco 1 each PO Q4-6PRN PRN #10 tab 11/16/18





5/325 (*)]  














Medical Decision Making





- Diagnostics


EKG Interpretation: 





EKG interpreted by me shows normal sinus rhythm with normal interval and axis.  

QRS is normal there is no significant ST elevation or depression.  No 

arrhythmia.  The rate is 56


Imaging Results: 


 Imaging Impressions





Chest X-Ray  11/16/18 08:00


Impression: Normal chest x-ray.








Chest x-ray interpreted by me is normal


Procedures: 





IV normal saline.  Toradol for pain


ED Course/Re-evaluation: 





Re-evaluation at 9:25 a.m..  Patient is stable.  The patient, her , and 

I discussed imaging, lab, EKG findings.  We discussed treatment plan including 

criteria for return and importance of follow-up and further evaluation.  They 

expressed understanding and agreement


Differential Diagnosis: 





Wells criteria and perc rule are negative.  I have considered thromboembolic 

disease and acute coronary syndrome.  No evidence for pneumonia or pneumothorax





I suspect that this is musculoskeletal in etiology.





- Data Points


Laboratory Results: 


 Laboratory Results





 11/16/18 07:50 





 11/16/18 07:50 





 











  11/16/18 11/16/18 11/16/18





  07:52 07:50 07:50


 


WBC      





    


 


RBC      





    


 


Hgb      





    


 


Hct      





    


 


MCV      





    


 


MCH      





    


 


MCHC      





    


 


RDW      





    


 


Plt Count      





    


 


MPV      





    


 


Neut % (Auto)      





    


 


Lymph % (Auto)      





    


 


Mono % (Auto)      





    


 


Eos % (Auto)      





    


 


Baso % (Auto)      





    


 


Nucleat RBC Rel Count      





    


 


Absolute Neuts (auto)      





    


 


Absolute Lymphs (auto)      





    


 


Absolute Monos (auto)      





    


 


Absolute Eos (auto)      





    


 


Absolute Basos (auto)      





    


 


Absolute Nucleated RBC      





    


 


Immature Gran %      





    


 


Immature Gran #      





    


 


D-Dimer    < 0.27 ug/mLFEU ug/mLFEU  





    (0.00-0.50)  


 


Sodium      





    


 


Potassium      





    


 


Chloride      





    


 


Carbon Dioxide      





    


 


Anion Gap      





    


 


BUN      





    


 


Creatinine      





    


 


Estimated GFR      





    


 


Glucose      





    


 


Calcium      





    


 


POC Troponin I  0.00 ng/mL ng/mL    





   (0.00-0.08)   


 


Beta HCG, Qual      NEGATIVE 





    














  11/16/18 11/16/18





  07:50 07:50


 


WBC    5.73 10^3/uL 10^3/uL





    (3.80-9.50) 


 


RBC    4.85 10^6/uL 10^6/uL





    (4.18-5.33) 


 


Hgb    14.7 g/dL g/dL





    (12.6-16.3) 


 


Hct    42.2 % %





    (38.0-47.0) 


 


MCV    87.0 fL fL





    (81.5-99.8) 


 


MCH    30.3 pg pg





    (27.9-34.1) 


 


MCHC    34.8 g/dL g/dL





    (32.4-36.7) 


 


RDW    12.0 % %





    (11.5-15.2) 


 


Plt Count    243 10^3/uL 10^3/uL





    (150-400) 


 


MPV    9.9 fL fL





    (8.7-11.7) 


 


Neut % (Auto)    45.7 % %





    (39.3-74.2) 


 


Lymph % (Auto)    41.7 % %





    (15.0-45.0) 


 


Mono % (Auto)    5.8 % %





    (4.5-13.0) 


 


Eos % (Auto)    5.6 % %





    (0.6-7.6) 


 


Baso % (Auto)    1.0 % %





    (0.3-1.7) 


 


Nucleat RBC Rel Count    0.0 % %





    (0.0-0.2) 


 


Absolute Neuts (auto)    2.62 10^3/uL 10^3/uL





    (1.70-6.50) 


 


Absolute Lymphs (auto)    2.39 10^3/uL 10^3/uL





    (1.00-3.00) 


 


Absolute Monos (auto)    0.33 10^3/uL 10^3/uL





    (0.30-0.80) 


 


Absolute Eos (auto)    0.32 10^3/uL 10^3/uL





    (0.03-0.40) 


 


Absolute Basos (auto)    0.06 10^3/uL 10^3/uL





    (0.02-0.10) 


 


Absolute Nucleated RBC    0.00 10^3/uL 10^3/uL





    (0-0.01) 


 


Immature Gran %    0.2 % %





    (0.0-1.1) 


 


Immature Gran #    0.01 10^3/uL 10^3/uL





    (0.00-0.10) 


 


D-Dimer    





   


 


Sodium  139 mEq/L mEq/L  





   (135-145)  


 


Potassium  4.3 mEq/L mEq/L  





   (3.3-5.0)  


 


Chloride  110 mEq/L mEq/L  





   ()  


 


Carbon Dioxide  21 mEq/l L mEq/l  





   (22-31)  


 


Anion Gap  8 mEq/L mEq/L  





   (6-14)  


 


BUN  12 mg/dL mg/dL  





   (7-23)  


 


Creatinine  0.7 mg/dL mg/dL  





   (0.6-1.0)  


 


Estimated GFR  > 60   





   


 


Glucose  79 mg/dL mg/dL  





   ()  


 


Calcium  9.8 mg/dL mg/dL  





   (8.5-10.4)  


 


POC Troponin I    





   


 


Beta HCG, Qual    





   











Medications Given: 


 








Discontinued Medications





Sodium Chloride (Ns)  1,000 mls @ 0 mls/hr IV EDNOW ONE; Wide Open


   PRN Reason: Protocol


   Stop: 11/16/18 07:59


   Last Admin: 11/16/18 08:06 Dose:  1,000 mls


Ketorolac Tromethamine (Toradol)  30 mg IVP EDNOW ONE


   Stop: 11/16/18 07:59


   Last Admin: 11/16/18 08:06 Dose:  30 mg





Point of Care Test Results: 


 Chemistry











  11/16/18





  07:52


 


POC Troponin I  0.00 ng/mL ng/mL





   (0.00-0.08) 














Departure





- Departure


Disposition: Home, Routine, Self-Care


Clinical Impression: 


 Chest wall pain





Condition: Good


Instructions:  Chest Wall Pain (ED)


Additional Instructions: 


Ibuprofen 600 mg every 6 hr for discomfort.  Hydrocodone in addition if 

necessary for pain





Heat to sore area of left chest.





Return for worsening pain, fever, trouble breathing





Re-evaluation in 2-3 days if not improved


Referrals: 


Delon Genao MD [Primary Care Provider] - 2-3 days, if not improved


Prescriptions: 


Hydrocodone/APAP 5/325 [Norco 5/325 (*)] 1 each PO Q4-6PRN PRN #10 tab


 PRN Reason: Pain, Moderate

## 2018-11-16 NOTE — CPEKG
Test Reason : OPEN

Blood Pressure : ***/*** mmHG

Vent. Rate : 056 BPM     Atrial Rate : 056 BPM

   P-R Int : 144 ms          QRS Dur : 077 ms

    QT Int : 405 ms       P-R-T Axes : 015 060 058 degrees

   QTc Int : 391 ms

 

Sinus rhythm

 

Confirmed by Ray Montague ([id number]) on 11/16/2018 8:01:14 AM

 

Referred By:             Confirmed By:Ray Montague